# Patient Record
Sex: FEMALE | Race: WHITE | URBAN - METROPOLITAN AREA
[De-identification: names, ages, dates, MRNs, and addresses within clinical notes are randomized per-mention and may not be internally consistent; named-entity substitution may affect disease eponyms.]

---

## 2023-08-10 ENCOUNTER — OFFICE VISIT (OUTPATIENT)
Dept: URBAN - METROPOLITAN AREA CLINIC 92 | Facility: CLINIC | Age: 33
End: 2023-08-10
Payer: COMMERCIAL

## 2023-08-10 ENCOUNTER — WEB ENCOUNTER (OUTPATIENT)
Dept: URBAN - METROPOLITAN AREA CLINIC 92 | Facility: CLINIC | Age: 33
End: 2023-08-10

## 2023-08-10 ENCOUNTER — DASHBOARD ENCOUNTERS (OUTPATIENT)
Age: 33
End: 2023-08-10

## 2023-08-10 VITALS
BODY MASS INDEX: 24.43 KG/M2 | HEART RATE: 99 BPM | TEMPERATURE: 96.9 F | WEIGHT: 152 LBS | HEIGHT: 66 IN | DIASTOLIC BLOOD PRESSURE: 91 MMHG | SYSTOLIC BLOOD PRESSURE: 122 MMHG

## 2023-08-10 DIAGNOSIS — R11.0 NAUSEA: ICD-10-CM

## 2023-08-10 DIAGNOSIS — R14.0 BLOATING: ICD-10-CM

## 2023-08-10 DIAGNOSIS — K31.84 GASTROPARESIS: ICD-10-CM

## 2023-08-10 DIAGNOSIS — K92.1 HEMATOCHEZIA: ICD-10-CM

## 2023-08-10 DIAGNOSIS — K63.89 SMALL INTESTINAL BACTERIAL OVERGROWTH (SIBO): ICD-10-CM

## 2023-08-10 DIAGNOSIS — K58.0 IRRITABLE BOWEL SYNDROME WITH DIARRHEA: ICD-10-CM

## 2023-08-10 PROBLEM — 235675006: Status: ACTIVE | Noted: 2023-08-10

## 2023-08-10 PROBLEM — 197125005: Status: ACTIVE | Noted: 2023-08-10

## 2023-08-10 PROCEDURE — 99204 OFFICE O/P NEW MOD 45 MIN: CPT

## 2023-08-10 RX ORDER — CYCLOBENZAPRINE HYDROCHLORIDE 5 MG/1
1 TABLET AT BEDTIME AS NEEDED TABLET, FILM COATED ORAL ONCE A DAY
Status: ON HOLD | COMMUNITY

## 2023-08-10 RX ORDER — DEXTROAMPHETAMINE SACCHARATE, AMPHETAMINE ASPARTATE MONOHYDRATE, DEXTROAMPHETAMINE SULFATE, AND AMPHETAMINE SULFATE 2.5; 2.5; 2.5; 2.5 MG/1; MG/1; MG/1; MG/1
1 CAPSULE IN THE MORNING CAPSULE ORAL ONCE A DAY
Status: ON HOLD | COMMUNITY

## 2023-08-10 RX ORDER — ERENUMAB-AOOE 140 MG/ML
AS DIRECTED INJECTION, SOLUTION SUBCUTANEOUS
Status: ON HOLD | COMMUNITY

## 2023-08-10 RX ORDER — ENOXAPARIN SODIUM 100 MG/ML
0.4 ML INJECTION SUBCUTANEOUS ONCE A DAY
Status: ON HOLD | COMMUNITY

## 2023-08-10 RX ORDER — FERROUS GLUCONATE 324 MG
1 TABLET WITH WATER OR JUICE BETWEEN MEALS TABLET ORAL ONCE A DAY
Status: ON HOLD | COMMUNITY

## 2023-08-10 RX ORDER — GABAPENTIN 100 MG/1
1 CAPSULE CAPSULE ORAL ONCE A DAY
Status: ON HOLD | COMMUNITY

## 2023-08-10 RX ORDER — BUTALBITAL, ACETAMINOPHEN, AND CAFFEINE 50; 300; 40 MG/1; MG/1; MG/1
1 CAPSULE AS NEEDED CAPSULE ORAL
Status: ON HOLD | COMMUNITY

## 2023-08-10 RX ORDER — CLINDAMYCIN PHOSPHATE 10 MG/ML
1 APPLICATION LOTION TOPICAL TWICE A DAY
Status: ON HOLD | COMMUNITY

## 2023-08-10 NOTE — HPI-TODAY'S VISIT:
Patient is a 32 year old female with a PMH of gastroparesis dx in 2015 who presents for IBS. Last seen in clinic in 2017 for a PEG check and prior to that seen in 2015 for gastroparesis.  Patient notes her gastroparesis has improved, mostly diet controlled. Some foods such as fibrous and fatty foods sit in her stomach longer. She recently went to [facility] in May and in June where they repeated a gastric emptying study which was borderline normal. Barium swallow, abdominal x-ray, and Lab work also done recently at [place], normal per patient. No anemia. Currently, patient c/o bloating and nausea. Patient also notes of urgency. Has three BM per day of looser stools sometimes sees mucus in stool which began a few months ago but is worsening. No correlation to food, maybe heavy foods. Admits to two episodes of vomiting. Denies abdominal pain. Patient admits to hematochezia when she wipes and does see blood in the mucus. Does have a history of IH. Has seen bright red blood for years. Patient has never had a colonoscopy. Patient is hesistant to have a colonoscopy because her grandmother had a perforation during her procedure. Patient denies a fmhx of colon cancer, IBD, or polyps.   Patient recently switched to cymbalta. Patient started Ibguard a few weeks ago and is on a probiotic. Patient notes she was treated for SIBO in the past with Xifaxan. Past Procedures : Fructan breath test 3/19/15: Negative EGD 3/11/15: Z-line irregular, normal stomach and duodenum EGD 2/17/15: Normal esophagus, erythematous mucosa in antrum, normal duodenum, bx show mild chronic gastritis, Negative for EOE, celiac, parasites, IM, or H. pylori Gastric emptying study 3/2/15: Delayed gastric emptying.

## 2023-08-31 ENCOUNTER — OFFICE VISIT (OUTPATIENT)
Dept: URBAN - METROPOLITAN AREA CLINIC 92 | Facility: CLINIC | Age: 33
End: 2023-08-31